# Patient Record
Sex: FEMALE | Race: BLACK OR AFRICAN AMERICAN | NOT HISPANIC OR LATINO
[De-identification: names, ages, dates, MRNs, and addresses within clinical notes are randomized per-mention and may not be internally consistent; named-entity substitution may affect disease eponyms.]

---

## 2020-03-03 ENCOUNTER — APPOINTMENT (OUTPATIENT)
Dept: OTOLARYNGOLOGY | Facility: CLINIC | Age: 60
End: 2020-03-03

## 2021-04-20 PROBLEM — Z00.00 ENCOUNTER FOR PREVENTIVE HEALTH EXAMINATION: Status: ACTIVE | Noted: 2021-04-20

## 2021-04-21 ENCOUNTER — APPOINTMENT (OUTPATIENT)
Dept: CARDIOLOGY | Facility: CLINIC | Age: 61
End: 2021-04-21
Payer: COMMERCIAL

## 2021-04-21 ENCOUNTER — NON-APPOINTMENT (OUTPATIENT)
Age: 61
End: 2021-04-21

## 2021-04-21 VITALS
WEIGHT: 147 LBS | RESPIRATION RATE: 16 BRPM | DIASTOLIC BLOOD PRESSURE: 84 MMHG | HEART RATE: 98 BPM | TEMPERATURE: 98.7 F | BODY MASS INDEX: 25.1 KG/M2 | SYSTOLIC BLOOD PRESSURE: 151 MMHG | OXYGEN SATURATION: 98 % | HEIGHT: 64 IN

## 2021-04-21 DIAGNOSIS — Z78.9 OTHER SPECIFIED HEALTH STATUS: ICD-10-CM

## 2021-04-21 DIAGNOSIS — E11.9 TYPE 2 DIABETES MELLITUS W/OUT COMPLICATIONS: ICD-10-CM

## 2021-04-21 DIAGNOSIS — H91.90 UNSPECIFIED HEARING LOSS, UNSPECIFIED EAR: ICD-10-CM

## 2021-04-21 DIAGNOSIS — R07.9 CHEST PAIN, UNSPECIFIED: ICD-10-CM

## 2021-04-21 DIAGNOSIS — Z80.42 FAMILY HISTORY OF MALIGNANT NEOPLASM OF PROSTATE: ICD-10-CM

## 2021-04-21 DIAGNOSIS — G62.9 POLYNEUROPATHY, UNSPECIFIED: ICD-10-CM

## 2021-04-21 DIAGNOSIS — E78.00 PURE HYPERCHOLESTEROLEMIA, UNSPECIFIED: ICD-10-CM

## 2021-04-21 PROCEDURE — 99072 ADDL SUPL MATRL&STAF TM PHE: CPT

## 2021-04-21 PROCEDURE — 99204 OFFICE O/P NEW MOD 45 MIN: CPT

## 2021-04-21 PROCEDURE — 93000 ELECTROCARDIOGRAM COMPLETE: CPT

## 2021-04-21 RX ORDER — CLOPIDOGREL 75 MG/1
75 TABLET, FILM COATED ORAL DAILY
Refills: 0 | Status: ACTIVE | COMMUNITY

## 2021-04-21 RX ORDER — EMPAGLIFLOZIN AND LINAGLIPTIN 10; 5 MG/1; MG/1
10-5 TABLET, FILM COATED ORAL DAILY
Refills: 0 | Status: ACTIVE | COMMUNITY

## 2021-04-21 RX ORDER — BIOTIN 10 MG
TABLET ORAL DAILY
Refills: 0 | Status: ACTIVE | COMMUNITY

## 2021-04-21 RX ORDER — METFORMIN HYDROCHLORIDE 1000 MG/1
1000 TABLET, COATED ORAL DAILY
Refills: 0 | Status: ACTIVE | COMMUNITY

## 2021-04-23 ENCOUNTER — OUTPATIENT (OUTPATIENT)
Dept: OUTPATIENT SERVICES | Facility: HOSPITAL | Age: 61
LOS: 1 days | End: 2021-04-23

## 2021-04-23 ENCOUNTER — APPOINTMENT (OUTPATIENT)
Dept: CV DIAGNOSTICS | Facility: HOSPITAL | Age: 61
End: 2021-04-23
Payer: COMMERCIAL

## 2021-04-23 ENCOUNTER — TRANSCRIPTION ENCOUNTER (OUTPATIENT)
Age: 61
End: 2021-04-23

## 2021-04-23 DIAGNOSIS — R07.9 CHEST PAIN, UNSPECIFIED: ICD-10-CM

## 2021-04-23 PROCEDURE — 78452 HT MUSCLE IMAGE SPECT MULT: CPT | Mod: 26

## 2021-04-23 PROCEDURE — 93018 CV STRESS TEST I&R ONLY: CPT | Mod: GC

## 2021-04-23 PROCEDURE — 93016 CV STRESS TEST SUPVJ ONLY: CPT | Mod: GC

## 2021-06-14 NOTE — REVIEW OF SYSTEMS
[Fever] : no fever [Chills] : no chills [Blurry Vision] : no blurred vision [Earache] : no earache [SOB] : no shortness of breath [Dyspnea on exertion] : not dyspnea during exertion [Chest Discomfort] : chest discomfort [Cough] : no cough [Abdominal Pain] : no abdominal pain [Dysuria] : no dysuria [Convulsions] : no convulsions [Limb Weakness (Paresis)] : no limb weakness (Paresis) [Confusion] : no confusion was observed [Easy Bleeding] : no tendency for easy bleeding

## 2021-06-14 NOTE — ASSESSMENT
[FreeTextEntry1] : 1.  Chest pain: Knowing her multiple risk factors I recommend nuclear stress test to rule out ischemia\par \par 2.  Hypertension: Blood pressure is elevated but we will see what her blood pressure is during the course of stress testing in the next clinic visit and if the blood pressure is persistently elevated then uptitrate her medications, in the interim I have recommended medication compliance, salt salt restriction, sleep hygiene and exercise.\par \par 3.  Hyperlipidemia: We will request records from primary care physician to go over her lipid profile.

## 2021-06-14 NOTE — HISTORY OF PRESENT ILLNESS
[FreeTextEntry1] : 61-year-old female with history of hypertension hyperlipidemia type 2 diabetes presented with episodes of chest pain for the last 3 months pain described as off-and-on sometimes exertional sometimes nonexertional on the left side like a sharp pain.  Symptoms have been pretty much constant for the last several months.

## 2021-11-02 ENCOUNTER — INPATIENT (INPATIENT)
Facility: HOSPITAL | Age: 61
LOS: 0 days | Discharge: HOME | End: 2021-11-03
Attending: INTERNAL MEDICINE | Admitting: INTERNAL MEDICINE
Payer: MEDICARE

## 2021-11-02 VITALS
RESPIRATION RATE: 18 BRPM | HEART RATE: 89 BPM | WEIGHT: 145.95 LBS | SYSTOLIC BLOOD PRESSURE: 150 MMHG | HEIGHT: 64 IN | TEMPERATURE: 98 F | OXYGEN SATURATION: 97 % | DIASTOLIC BLOOD PRESSURE: 77 MMHG

## 2021-11-02 LAB
ALBUMIN SERPL ELPH-MCNC: 3.7 G/DL — SIGNIFICANT CHANGE UP (ref 3.3–5)
ALP SERPL-CCNC: 68 U/L — SIGNIFICANT CHANGE UP (ref 40–120)
ALT FLD-CCNC: 20 U/L — SIGNIFICANT CHANGE UP (ref 12–78)
ANION GAP SERPL CALC-SCNC: 4 MMOL/L — LOW (ref 5–17)
APPEARANCE UR: CLEAR — SIGNIFICANT CHANGE UP
AST SERPL-CCNC: 13 U/L — LOW (ref 15–37)
BASOPHILS # BLD AUTO: 0.02 K/UL — SIGNIFICANT CHANGE UP (ref 0–0.2)
BASOPHILS NFR BLD AUTO: 0.2 % — SIGNIFICANT CHANGE UP (ref 0–2)
BILIRUB SERPL-MCNC: 0.4 MG/DL — SIGNIFICANT CHANGE UP (ref 0.2–1.2)
BILIRUB UR-MCNC: NEGATIVE — SIGNIFICANT CHANGE UP
BUN SERPL-MCNC: 13 MG/DL — SIGNIFICANT CHANGE UP (ref 7–23)
CALCIUM SERPL-MCNC: 9.5 MG/DL — SIGNIFICANT CHANGE UP (ref 8.5–10.1)
CHLORIDE SERPL-SCNC: 108 MMOL/L — SIGNIFICANT CHANGE UP (ref 96–108)
CO2 SERPL-SCNC: 30 MMOL/L — SIGNIFICANT CHANGE UP (ref 22–31)
COLOR SPEC: YELLOW — SIGNIFICANT CHANGE UP
CREAT SERPL-MCNC: 0.66 MG/DL — SIGNIFICANT CHANGE UP (ref 0.5–1.3)
DIFF PNL FLD: NEGATIVE — SIGNIFICANT CHANGE UP
EOSINOPHIL # BLD AUTO: 0.01 K/UL — SIGNIFICANT CHANGE UP (ref 0–0.5)
EOSINOPHIL NFR BLD AUTO: 0.1 % — SIGNIFICANT CHANGE UP (ref 0–6)
FLUAV AG NPH QL: SIGNIFICANT CHANGE UP
FLUBV AG NPH QL: SIGNIFICANT CHANGE UP
GLUCOSE BLDC GLUCOMTR-MCNC: 110 MG/DL — HIGH (ref 70–99)
GLUCOSE BLDC GLUCOMTR-MCNC: 125 MG/DL — HIGH (ref 70–99)
GLUCOSE BLDC GLUCOMTR-MCNC: 174 MG/DL — HIGH (ref 70–99)
GLUCOSE SERPL-MCNC: 132 MG/DL — HIGH (ref 70–99)
GLUCOSE UR QL: 1000 MG/DL
HCT VFR BLD CALC: 43.4 % — SIGNIFICANT CHANGE UP (ref 34.5–45)
HGB BLD-MCNC: 13.3 G/DL — SIGNIFICANT CHANGE UP (ref 11.5–15.5)
IMM GRANULOCYTES NFR BLD AUTO: 0.4 % — SIGNIFICANT CHANGE UP (ref 0–1.5)
KETONES UR-MCNC: ABNORMAL
LEUKOCYTE ESTERASE UR-ACNC: NEGATIVE — SIGNIFICANT CHANGE UP
LIDOCAIN IGE QN: 815 U/L — HIGH (ref 73–393)
LYMPHOCYTES # BLD AUTO: 1.59 K/UL — SIGNIFICANT CHANGE UP (ref 1–3.3)
LYMPHOCYTES # BLD AUTO: 18.9 % — SIGNIFICANT CHANGE UP (ref 13–44)
MAGNESIUM SERPL-MCNC: 2.1 MG/DL — SIGNIFICANT CHANGE UP (ref 1.6–2.6)
MCHC RBC-ENTMCNC: 22.9 PG — LOW (ref 27–34)
MCHC RBC-ENTMCNC: 30.6 GM/DL — LOW (ref 32–36)
MCV RBC AUTO: 74.7 FL — LOW (ref 80–100)
MONOCYTES # BLD AUTO: 0.42 K/UL — SIGNIFICANT CHANGE UP (ref 0–0.9)
MONOCYTES NFR BLD AUTO: 5 % — SIGNIFICANT CHANGE UP (ref 2–14)
NEUTROPHILS # BLD AUTO: 6.35 K/UL — SIGNIFICANT CHANGE UP (ref 1.8–7.4)
NEUTROPHILS NFR BLD AUTO: 75.4 % — SIGNIFICANT CHANGE UP (ref 43–77)
NITRITE UR-MCNC: NEGATIVE — SIGNIFICANT CHANGE UP
NRBC # BLD: 0 /100 WBCS — SIGNIFICANT CHANGE UP (ref 0–0)
NT-PROBNP SERPL-SCNC: 20 PG/ML — SIGNIFICANT CHANGE UP (ref 0–125)
PH UR: 5 — SIGNIFICANT CHANGE UP (ref 5–8)
PLATELET # BLD AUTO: 229 K/UL — SIGNIFICANT CHANGE UP (ref 150–400)
POTASSIUM SERPL-MCNC: 3.9 MMOL/L — SIGNIFICANT CHANGE UP (ref 3.5–5.3)
POTASSIUM SERPL-SCNC: 3.9 MMOL/L — SIGNIFICANT CHANGE UP (ref 3.5–5.3)
PROT SERPL-MCNC: 7.9 GM/DL — SIGNIFICANT CHANGE UP (ref 6–8.3)
PROT UR-MCNC: NEGATIVE MG/DL — SIGNIFICANT CHANGE UP
RBC # BLD: 5.81 M/UL — HIGH (ref 3.8–5.2)
RBC # FLD: 16.8 % — HIGH (ref 10.3–14.5)
SARS-COV-2 RNA SPEC QL NAA+PROBE: SIGNIFICANT CHANGE UP
SODIUM SERPL-SCNC: 142 MMOL/L — SIGNIFICANT CHANGE UP (ref 135–145)
SP GR SPEC: 1.01 — SIGNIFICANT CHANGE UP (ref 1.01–1.02)
TROPONIN I, HIGH SENSITIVITY RESULT: <3 NG/L — SIGNIFICANT CHANGE UP
UROBILINOGEN FLD QL: NEGATIVE MG/DL — SIGNIFICANT CHANGE UP
WBC # BLD: 8.42 K/UL — SIGNIFICANT CHANGE UP (ref 3.8–10.5)
WBC # FLD AUTO: 8.42 K/UL — SIGNIFICANT CHANGE UP (ref 3.8–10.5)

## 2021-11-02 PROCEDURE — 99223 1ST HOSP IP/OBS HIGH 75: CPT

## 2021-11-02 PROCEDURE — 70450 CT HEAD/BRAIN W/O DYE: CPT | Mod: 26,MA

## 2021-11-02 PROCEDURE — 93010 ELECTROCARDIOGRAM REPORT: CPT

## 2021-11-02 PROCEDURE — 99285 EMERGENCY DEPT VISIT HI MDM: CPT

## 2021-11-02 PROCEDURE — 71045 X-RAY EXAM CHEST 1 VIEW: CPT | Mod: 26

## 2021-11-02 RX ORDER — INFLUENZA VIRUS VACCINE 15; 15; 15; 15 UG/.5ML; UG/.5ML; UG/.5ML; UG/.5ML
0.5 SUSPENSION INTRAMUSCULAR ONCE
Refills: 0 | Status: DISCONTINUED | OUTPATIENT
Start: 2021-11-02 | End: 2021-11-03

## 2021-11-02 RX ORDER — EMPAGLIFLOZIN AND LINAGLIPTIN 10; 5 MG/1; MG/1
1 TABLET, FILM COATED ORAL
Qty: 0 | Refills: 0 | DISCHARGE

## 2021-11-02 RX ORDER — INSULIN LISPRO 100/ML
5 VIAL (ML) SUBCUTANEOUS
Refills: 0 | Status: DISCONTINUED | OUTPATIENT
Start: 2021-11-02 | End: 2021-11-03

## 2021-11-02 RX ORDER — CLOPIDOGREL BISULFATE 75 MG/1
75 TABLET, FILM COATED ORAL DAILY
Refills: 0 | Status: DISCONTINUED | OUTPATIENT
Start: 2021-11-02 | End: 2021-11-03

## 2021-11-02 RX ORDER — DEXTROSE 50 % IN WATER 50 %
12.5 SYRINGE (ML) INTRAVENOUS ONCE
Refills: 0 | Status: DISCONTINUED | OUTPATIENT
Start: 2021-11-02 | End: 2021-11-03

## 2021-11-02 RX ORDER — SODIUM CHLORIDE 9 MG/ML
1000 INJECTION, SOLUTION INTRAVENOUS
Refills: 0 | Status: DISCONTINUED | OUTPATIENT
Start: 2021-11-02 | End: 2021-11-03

## 2021-11-02 RX ORDER — CLOPIDOGREL BISULFATE 75 MG/1
1 TABLET, FILM COATED ORAL
Qty: 0 | Refills: 0 | DISCHARGE

## 2021-11-02 RX ORDER — INSULIN GLARGINE 100 [IU]/ML
10 INJECTION, SOLUTION SUBCUTANEOUS AT BEDTIME
Refills: 0 | Status: DISCONTINUED | OUTPATIENT
Start: 2021-11-02 | End: 2021-11-03

## 2021-11-02 RX ORDER — DEXTROSE 50 % IN WATER 50 %
15 SYRINGE (ML) INTRAVENOUS ONCE
Refills: 0 | Status: DISCONTINUED | OUTPATIENT
Start: 2021-11-02 | End: 2021-11-03

## 2021-11-02 RX ORDER — GABAPENTIN 400 MG/1
0 CAPSULE ORAL
Qty: 0 | Refills: 0 | DISCHARGE

## 2021-11-02 RX ORDER — DEXTROSE 50 % IN WATER 50 %
25 SYRINGE (ML) INTRAVENOUS ONCE
Refills: 0 | Status: DISCONTINUED | OUTPATIENT
Start: 2021-11-02 | End: 2021-11-03

## 2021-11-02 RX ORDER — ATORVASTATIN CALCIUM 80 MG/1
1 TABLET, FILM COATED ORAL
Qty: 0 | Refills: 0 | DISCHARGE

## 2021-11-02 RX ORDER — LISINOPRIL 2.5 MG/1
20 TABLET ORAL DAILY
Refills: 0 | Status: DISCONTINUED | OUTPATIENT
Start: 2021-11-02 | End: 2021-11-03

## 2021-11-02 RX ORDER — GABAPENTIN 400 MG/1
100 CAPSULE ORAL DAILY
Refills: 0 | Status: DISCONTINUED | OUTPATIENT
Start: 2021-11-02 | End: 2021-11-03

## 2021-11-02 RX ORDER — GLUCAGON INJECTION, SOLUTION 0.5 MG/.1ML
1 INJECTION, SOLUTION SUBCUTANEOUS ONCE
Refills: 0 | Status: DISCONTINUED | OUTPATIENT
Start: 2021-11-02 | End: 2021-11-03

## 2021-11-02 RX ORDER — HEPARIN SODIUM 5000 [USP'U]/ML
5000 INJECTION INTRAVENOUS; SUBCUTANEOUS EVERY 12 HOURS
Refills: 0 | Status: DISCONTINUED | OUTPATIENT
Start: 2021-11-02 | End: 2021-11-03

## 2021-11-02 RX ORDER — RAMIPRIL 5 MG
1 CAPSULE ORAL
Qty: 0 | Refills: 0 | DISCHARGE

## 2021-11-02 RX ORDER — METFORMIN HYDROCHLORIDE 850 MG/1
0 TABLET ORAL
Qty: 0 | Refills: 0 | DISCHARGE

## 2021-11-02 RX ORDER — ATORVASTATIN CALCIUM 80 MG/1
40 TABLET, FILM COATED ORAL AT BEDTIME
Refills: 0 | Status: DISCONTINUED | OUTPATIENT
Start: 2021-11-02 | End: 2021-11-03

## 2021-11-02 RX ADMIN — INSULIN GLARGINE 10 UNIT(S): 100 INJECTION, SOLUTION SUBCUTANEOUS at 22:51

## 2021-11-02 RX ADMIN — GABAPENTIN 100 MILLIGRAM(S): 400 CAPSULE ORAL at 22:50

## 2021-11-02 RX ADMIN — HEPARIN SODIUM 5000 UNIT(S): 5000 INJECTION INTRAVENOUS; SUBCUTANEOUS at 22:50

## 2021-11-02 RX ADMIN — ATORVASTATIN CALCIUM 40 MILLIGRAM(S): 80 TABLET, FILM COATED ORAL at 22:50

## 2021-11-02 RX ADMIN — CLOPIDOGREL BISULFATE 75 MILLIGRAM(S): 75 TABLET, FILM COATED ORAL at 22:50

## 2021-11-02 NOTE — H&P ADULT - NSHPPHYSICALEXAM_GEN_ALL_CORE
GENERAL: NAD, well-groomed, well-developed  HEAD:  Atraumatic, Normocephalic  EYES: EOMI, PERRLA, conjunctiva and sclera clear  ENMT: No tonsillar erythema, exudates, or enlargement; Moist mucous membranes, Good dentition, No lesions  NECK: Supple, No JVD, Normal thyroid  NERVOUS SYSTEM:  Alert & Oriented X3, non focal, fluent speech  CHEST/LUNG: Clear to percussion bilaterally; No rales, rhonchi, wheezing, or rubs  HEART: Regular rate and rhythm; No murmurs, rubs, or gallops  ABDOMEN: Soft, Nontender, Nondistended; Bowel sounds present  EXTREMITIES:  2+ Peripheral Pulses, No clubbing, cyanosis, or edema  LYMPH: No lymphadenopathy noted   SKIN: No rashes or lesions

## 2021-11-02 NOTE — ED PROVIDER NOTE - NEUROLOGICAL, MLM
Alert and oriented, no focal deficits, no dysmetria, normal finger to nose, slight left hand weakness but which is baseline per patient, fluent in speech, no word finding difficulty on exam

## 2021-11-02 NOTE — ED PROVIDER NOTE - CLINICAL SUMMARY MEDICAL DECISION MAKING FREE TEXT BOX
DDx: CVA outside of TPA window, complex migraine.  Plan: CT head, neuro consult, CBC, CMP, mag, troponin, and reassess. DDx: CVA outside of TPA window, complex migraine. NIH SS 0. Speech is fluent on exam  Plan: CT head, neuro consult, CBC, CMP, mag, troponin, and reassess.

## 2021-11-02 NOTE — H&P ADULT - HISTORY OF PRESENT ILLNESS
61 year old female w/PMH of CVA (on Plavix) w/residual left sided weakness, DM (on Metformin), presents to the ED BIBEMS sent by PMD for intermittent AMS and difficulty with word finding x1-2 weeks. Pt also c/o intermittent right sided headache and lightheadedness x2 weeks. Denies fever/chills, cough, SOB, CP, abdominal pain, visual changes, facial droop, slurred speech or focal weakness. Pt is vaccinated against COVID w/Moderna.  Pt has H/o left thalamic lacunar infarct 2 yrs ago.  Pt saw her ophthalmologist 2 days ago.  Currently denies any symptoms.

## 2021-11-02 NOTE — H&P ADULT - NSHPREVIEWOFSYSTEMS_GEN_ALL_CORE
CONSTITUTIONAL: No fever, weight loss, or fatigue  EYES: No eye pain, visual disturbances, or discharge  ENMT:  No difficulty hearing, tinnitus, vertigo; No sinus or throat pain  NECK: No pain or stiffness  BREASTS: No pain, masses, or nipple discharge  RESPIRATORY: No cough, wheezing, chills or hemoptysis; No shortness of breath  CARDIOVASCULAR: No chest pain, palpitations, dizziness, or leg swelling  GASTROINTESTINAL: No abdominal or epigastric pain. No nausea, vomiting, or hematemesis; No diarrhea or constipation. No melena or hematochezia.  GENITOURINARY: No dysuria, frequency, hematuria, or incontinence  NEUROLOGICAL: + headaches, confusion, no memory loss, loss of strength, numbness, or tremors  SKIN: No itching, burning, rashes, or lesions   LYMPH NODES: No enlarged glands  ENDOCRINE: No heat or cold intolerance; No hair loss  MUSCULOSKELETAL: No joint pain or swelling; No muscle, back, or extremity pain  PSYCHIATRIC: No depression, anxiety, mood swings, or difficulty sleeping  HEME/LYMPH: No easy bruising, or bleeding gums  ALLERGY AND IMMUNOLOGIC: No hives or eczema

## 2021-11-02 NOTE — H&P ADULT - ASSESSMENT
61 year old female w/PMH of CVA (on Plavix) w/residual left sided weakness, DM (on Metformin), presents to the ED BIBEMS sent by PMD for intermittent AMS and difficulty with word finding x1-2 weeks. Pt also c/o intermittent right sided headache and lightheadedness x2 weeks. Denies fever/chills, cough, SOB, CP, abdominal pain, visual changes, facial droop, slurred speech or focal weakness. Pt is vaccinated against COVID w/Moderna.  Pt has H/o left thalamic lacunar infarct 2 yrs ago.  Pt saw her ophthalmologist 2 days ago.  Currently denies any symptoms. 61 year old female w/PMH of CVA (on Plavix) w/residual left sided weakness, DM (on Metformin), presents to the ED BIBEMS sent by PMD for intermittent AMS and difficulty with word finding x1-2 weeks. Pt also c/o intermittent right sided headache and lightheadedness x2 weeks. Denies fever/chills, cough, SOB, CP, abdominal pain, visual changes, facial droop, slurred speech or focal weakness. Pt is vaccinated against COVID w/Moderna.  Pt has H/o left thalamic lacunar infarct 2 yrs ago.  Pt saw her ophthalmologist 2 days ago.  Currently denies any symptoms.    R/O Acute CVA/TIA:  - Symptoms been going on for few wks intermittently  - Migraine?  - CT with Confluent hypodensity involving the white matter of the right centrum semiovale which is asymmetrically more prominent than the left side. Additionally there is is hypodensity involving the subcortical white matter of the right Broca area. This can just represent chronic microvascular ischemic changes. However, acute white matter infarct cannot be excluded. No acute intracranial hemorrhage.  - Follow up MRI brain, MRA head and neck  - 2D echo  - Telemetry  - Lipid panel, Hb A1c  - Continue Plavix, increase Lipitor to 40 mg QHS  - Dr. Moon was consulted by ER     DM:  - Hold home meds  - HbA1C  - Insulin basal/ bolus regimen    Elevated lipase:  - Asymptomatic  - Continue to monitor    DVT ppx

## 2021-11-02 NOTE — PATIENT PROFILE ADULT - STATED REASON FOR ADMISSION
Patient reports feeling headache (sharp), lightheaded every morning for 2 weeks. Instructed to come in by PCP.

## 2021-11-02 NOTE — ED PROVIDER NOTE - OBJECTIVE STATEMENT
61 year old female w/PMH of CVA (on Plavix) w/residual left sided weakness, DM (on Metformin), presents to the ED BIBEMS sent by PMD for intermittent AMS and difficulty with word finding x1-2 weeks, r/o CVA/TIA. Pt also c/o intermittent right sided headache and lightheadedness x2 weeks. Denies fever/chills, cough, SOB, CP, abdominal pain, visual changes, facial droop, slurred speech or focal weakness. Pt is vaccinated against COVID w/Moderna.

## 2021-11-02 NOTE — ED ADULT TRIAGE NOTE - DOMESTIC TRAVEL HIGH RISK QUESTION
Zoltan Hart from [facility] returned my call. Spoke to her concerning Dr Aaliyah Vivas concerns. Dwayne Espinoza will be following and setting patient up to come into the care center. Dwayne Espinoza will look into mental health services for patient. Dr TRACY [facility] will be following patient will at [facility]. Dwayne Espinoza will send discharge instructions to me at discharge. No

## 2021-11-02 NOTE — ED ADULT TRIAGE NOTE - CHIEF COMPLAINT QUOTE
pt sent to Ed by pmd for intermittent AMS and difficulty finding words x 2 weeks to r/o TIA. pt also c/o intermittent headache. hx: cva with left side weakness, DM,

## 2021-11-02 NOTE — H&P ADULT - NSHPLABSRESULTS_GEN_ALL_CORE
13.3   8.42  )-----------( 229      ( 2021 15:15 )             43.4     11    142  |  108  |  13  ----------------------------<  132<H>  3.9   |  30  |  0.66    Ca    9.5      2021 15:15  Mg     2.1         TPro  7.9  /  Alb  3.7  /  TBili  0.4  /  DBili  x   /  AST  13<L>  /  ALT  20  /  AlkPhos  68        Urinalysis Basic - ( 2021 15:24 )    Color: Yellow / Appearance: Clear / S.010 / pH: x  Gluc: x / Ketone: Trace  / Bili: Negative / Urobili: Negative mg/dL   Blood: x / Protein: Negative mg/dL / Nitrite: Negative   Leuk Esterase: Negative / RBC: x / WBC x   Sq Epi: x / Non Sq Epi: x / Bacteria: x

## 2021-11-03 ENCOUNTER — TRANSCRIPTION ENCOUNTER (OUTPATIENT)
Age: 61
End: 2021-11-03

## 2021-11-03 VITALS
RESPIRATION RATE: 18 BRPM | HEART RATE: 97 BPM | TEMPERATURE: 98 F | DIASTOLIC BLOOD PRESSURE: 71 MMHG | SYSTOLIC BLOOD PRESSURE: 107 MMHG | OXYGEN SATURATION: 98 %

## 2021-11-03 LAB
A1C WITH ESTIMATED AVERAGE GLUCOSE RESULT: 6.8 % — HIGH (ref 4–5.6)
CHOLEST SERPL-MCNC: 156 MG/DL — SIGNIFICANT CHANGE UP
COVID-19 NUCLEOCAPSID GAM AB INTERP: NEGATIVE — SIGNIFICANT CHANGE UP
COVID-19 NUCLEOCAPSID TOTAL GAM ANTIBODY RESULT: 0.12 INDEX — SIGNIFICANT CHANGE UP
COVID-19 SPIKE DOMAIN AB INTERP: POSITIVE
COVID-19 SPIKE DOMAIN ANTIBODY RESULT: >250 U/ML — HIGH
ERYTHROCYTE [SEDIMENTATION RATE] IN BLOOD: 7 MM/HR — SIGNIFICANT CHANGE UP (ref 0–20)
ESTIMATED AVERAGE GLUCOSE: 148 MG/DL — HIGH (ref 68–114)
GLUCOSE BLDC GLUCOMTR-MCNC: 102 MG/DL — HIGH (ref 70–99)
GLUCOSE BLDC GLUCOMTR-MCNC: 164 MG/DL — HIGH (ref 70–99)
GLUCOSE BLDC GLUCOMTR-MCNC: 90 MG/DL — SIGNIFICANT CHANGE UP (ref 70–99)
HCV AB S/CO SERPL IA: 0.12 S/CO — SIGNIFICANT CHANGE UP (ref 0–0.99)
HCV AB SERPL-IMP: SIGNIFICANT CHANGE UP
HDLC SERPL-MCNC: 59 MG/DL — SIGNIFICANT CHANGE UP
LIPID PNL WITH DIRECT LDL SERPL: 81 MG/DL — SIGNIFICANT CHANGE UP
NON HDL CHOLESTEROL: 97 MG/DL — SIGNIFICANT CHANGE UP
SARS-COV-2 IGG+IGM SERPL QL IA: 0.12 INDEX — SIGNIFICANT CHANGE UP
SARS-COV-2 IGG+IGM SERPL QL IA: >250 U/ML — HIGH
SARS-COV-2 IGG+IGM SERPL QL IA: NEGATIVE — SIGNIFICANT CHANGE UP
SARS-COV-2 IGG+IGM SERPL QL IA: POSITIVE
TRIGL SERPL-MCNC: 79 MG/DL — SIGNIFICANT CHANGE UP
TSH SERPL-MCNC: 1.14 UIU/ML — SIGNIFICANT CHANGE UP (ref 0.36–3.74)

## 2021-11-03 PROCEDURE — 99239 HOSP IP/OBS DSCHRG MGMT >30: CPT

## 2021-11-03 PROCEDURE — 93306 TTE W/DOPPLER COMPLETE: CPT | Mod: 26

## 2021-11-03 PROCEDURE — 70547 MR ANGIOGRAPHY NECK W/O DYE: CPT | Mod: 26

## 2021-11-03 PROCEDURE — 70544 MR ANGIOGRAPHY HEAD W/O DYE: CPT | Mod: 26,59

## 2021-11-03 PROCEDURE — 70551 MRI BRAIN STEM W/O DYE: CPT | Mod: 26

## 2021-11-03 RX ORDER — MECLIZINE HCL 12.5 MG
1 TABLET ORAL
Qty: 15 | Refills: 0
Start: 2021-11-03 | End: 2021-11-17

## 2021-11-03 RX ORDER — ATORVASTATIN CALCIUM 80 MG/1
80 TABLET, FILM COATED ORAL AT BEDTIME
Refills: 0 | Status: DISCONTINUED | OUTPATIENT
Start: 2021-11-03 | End: 2021-11-03

## 2021-11-03 RX ADMIN — HEPARIN SODIUM 5000 UNIT(S): 5000 INJECTION INTRAVENOUS; SUBCUTANEOUS at 10:01

## 2021-11-03 RX ADMIN — CLOPIDOGREL BISULFATE 75 MILLIGRAM(S): 75 TABLET, FILM COATED ORAL at 12:52

## 2021-11-03 RX ADMIN — Medication 5 UNIT(S): at 08:35

## 2021-11-03 RX ADMIN — Medication 5 UNIT(S): at 12:52

## 2021-11-03 RX ADMIN — Medication 1 MILLIGRAM(S): at 10:00

## 2021-11-03 NOTE — OCCUPATIONAL THERAPY INITIAL EVALUATION ADULT - COORDINATION ASSESSED, REHAB EVAL
intact in BUE diminished in LUE/ LLE/finger to nose/heel to shin intact in BUE,  diminished in LUE/ LLE/finger to nose/heel to shin

## 2021-11-03 NOTE — OCCUPATIONAL THERAPY INITIAL EVALUATION ADULT - GENERAL OBSERVATIONS, REHAB EVAL
Pt was seen for initial OT consult, encountered in bed on  on cardiac monitoring. Pt makes needs, wants known, moves BUE/ BLE without difficulty and integrates both sides of her body despite left residual weakness from old CVA. Pt was AA&Ox4, cooperative & followed commands. Pt c/o denied pain, but c/o dizziness with positional changes. This limits pt's activity tolerance ,balance, ADL management and functional mobility.

## 2021-11-03 NOTE — CONSULT NOTE ADULT - ASSESSMENT
To be completed    She may be developing cognitive impairment unrelated to the chronic stroke with resodual L hemiparesis.        RECOMMENDATIONS    SER, CRP, TSH, syphilis serology, B12, folate, methylmalonic acid, homocysteine, AURELIA, SPEP    (I have ordered the lab work; Pt to follow-up w neurology as out-patient)                                                         IMPORTANT  -  PLEASE NOTE:                              I am a neurohospitalist. I do not see patients outside of the hospital.        Patients requiring neurological follow-up after discharge may contact:       [place] [address]  [phone]    Neurocognitive neurologists:    Dr. Price King

## 2021-11-03 NOTE — PHYSICAL THERAPY INITIAL EVALUATION ADULT - PERTINENT HX OF CURRENT PROBLEM, REHAB EVAL
61 year old female w/PMH of CVA (on Plavix) w/residual left sided weakness, DM (on Metformin), presents to the ED BIBEMS sent by PMD for intermittent AMS and difficulty with word finding x1-2 weeks. Pt also c/o intermittent right sided headache and lightheadedness x2 weeks.

## 2021-11-03 NOTE — OCCUPATIONAL THERAPY INITIAL EVALUATION ADULT - PERTINENT HX OF CURRENT PROBLEM, REHAB EVAL
Pt presented to ER due to acute onset of neurological deficits. Pt is diagnosed with CVA with difficulty finding words. MRI on 11/3/21 results confirm no acute intracranial hemorrhage or acute infarct

## 2021-11-03 NOTE — DISCHARGE NOTE NURSING/CASE MANAGEMENT/SOCIAL WORK - PATIENT PORTAL LINK FT
You can access the FollowMyHealth Patient Portal offered by [facility] by registering at the following website: [email or web address]. By joining [employer]’s FollowMyHealth portal, you will also be able to view your health information using other applications (apps) compatible with our system.

## 2021-11-03 NOTE — DISCHARGE NOTE PROVIDER - CARE PROVIDER_API CALL
Karina Moon  NEUROLOGY - GENERAL  [address] [place] [address]  Phone: [phone]  Fax: [phone]  Follow Up Time:

## 2021-11-03 NOTE — OCCUPATIONAL THERAPY INITIAL EVALUATION ADULT - ADDITIONAL COMMENTS
Prior to admission, pt was functioning in her roles, self sufficient & ambulating independently without any assistive devices. Pt does not show any significant deficit as compared to preadmission status. Pt is right hand dominant and wears glasses for reading. Dexterity and fine motor skills are diminished in left hand due to old CVA.. Prior to admission, pt was functioning in her roles, self sufficient & ambulating independently without any assistive devices. Pt does not show any significant deficits  as compared to preadmission status. Pt is right hand dominant and wears glasses for reading. Dexterity and fine motor skills are diminished in left hand due to old CVA..

## 2021-11-03 NOTE — PHYSICAL THERAPY INITIAL EVALUATION ADULT - GENERAL OBSERVATIONS, REHAB EVAL
Pt was seen in semi-supine c cardiac monitor donned, alert and Ox4. Pt was comfortable and motivated. Sensation and visual field are intact. Dysdiadokinesia test of all four extremities is grossly intact except L ankle is slightly impaired due to old stroke.

## 2021-11-03 NOTE — OCCUPATIONAL THERAPY INITIAL EVALUATION ADULT - LIVES WITH, PROFILE
in a private house with 8 steps to enter equipped with B/L ascending handrail. Once inside, pt has to negotiate 2 flights of stairs , with left handrail to access the bedroom and bathroom. The bathroom has a tub/shower combination, fixed / retractable handrail  and standard toilet./spouse

## 2021-11-03 NOTE — OCCUPATIONAL THERAPY INITIAL EVALUATION ADULT - SOCIAL CONCERNS
Pt voiced concerns about current symptoms of CVA and hops it does not recur./Complex psychosocial needs/coping issues

## 2021-11-03 NOTE — DISCHARGE NOTE PROVIDER - NSDCMRMEDTOKEN_GEN_ALL_CORE_FT
gabapentin 100 mg oral tablet:   Glyxambi 10 mg-5 mg oral tablet: 1 tab(s) orally once a day (in the morning)  Lipitor 10 mg oral tablet: 1 tab(s) orally once a day  metFORMIN 1000 mg oral tablet:   Plavix 75 mg oral tablet: 1 tab(s) orally once a day  ramipril 5 mg oral capsule: 1 cap(s) orally once a day   gabapentin 100 mg oral tablet:   Glyxambi 10 mg-5 mg oral tablet: 1 tab(s) orally once a day (in the morning)  Lipitor 10 mg oral tablet: 1 tab(s) orally once a day  meclizine 12.5 mg oral tablet: 1 tab(s) orally once a day, As Needed -for dizziness  metFORMIN 1000 mg oral tablet:   Plavix 75 mg oral tablet: 1 tab(s) orally once a day  ramipril 5 mg oral capsule: 1 cap(s) orally once a day

## 2021-11-03 NOTE — PHYSICAL THERAPY INITIAL EVALUATION ADULT - GAIT TRAINING, PT EVAL
Pt will independently ambulate 500 feet with rolling walker without loss of balance, by 2-3days. Pt will independently ambulate 500 feet with rolling walker without loss of balance, by discharge. Pt will independently ambulate 500 feet w/o assist device and w/o loss of balance, by discharge.

## 2021-11-03 NOTE — OCCUPATIONAL THERAPY INITIAL EVALUATION ADULT - BALANCE TRAINING, PT EVAL
Pt will increased standing balance from good- to good in 3 days to prevent falls, optimize pt's ability for ADL management & safely navigate in all terrains

## 2021-11-03 NOTE — DISCHARGE NOTE PROVIDER - NSDCCPCAREPLAN_GEN_ALL_CORE_FT
PRINCIPAL DISCHARGE DIAGNOSIS  Diagnosis: Migraine headache  Assessment and Plan of Treatment:       SECONDARY DISCHARGE DIAGNOSES  Diagnosis: Type 2 diabetes mellitus  Assessment and Plan of Treatment:     Diagnosis: HLD (hyperlipidemia)  Assessment and Plan of Treatment:     Diagnosis: Benign essential HTN  Assessment and Plan of Treatment:

## 2021-11-03 NOTE — DISCHARGE NOTE PROVIDER - HOSPITAL COURSE
61 year old female w/PMH of CVA (on Plavix) w/residual left sided weakness, DM (on Metformin), presents to the ED BIBEMS sent by PMD for intermittent AMS and difficulty with word finding x1-2 weeks. Pt also c/o intermittent right sided headache and lightheadedness x2 weeks. Denies fever/chills, cough, SOB, CP, abdominal pain, visual changes, facial droop, slurred speech or focal weakness. Pt is vaccinated against COVID w/Moderna.  Pt has H/o left thalamic lacunar infarct 2 yrs ago.  Pt saw her ophthalmologist 2 days ago.  Currently denies any symptoms.    Migraine  - Symptoms been going on for few wks intermittently  - CT with Confluent hypodensity involving the white matter of the right centrum semiovale which is asymmetrically more prominent than the left side. Additionally there is is hypodensity involving the subcortical white matter of the right Broca area. This can just represent chronic microvascular ischemic changes. However, acute white matter infarct cannot be excluded. No acute intracranial hemorrhage.  - MRI brain, MRA head and neck; No acute intracranial hemorrhage or acute infarct. No hemodynamically significant stenosis  - 2D echo pending result   - Telemetry  - Continue Plavix, increase Lipitor to 40 mg QHS  - Dr. Moon f/up as outpt    DM2:  - resume home meds    Elevated lipase:  - Asymptomatic  - Continue to monitor

## 2021-11-04 LAB
CRP SERPL-MCNC: <3 MG/L — SIGNIFICANT CHANGE UP
CULTURE RESULTS: SIGNIFICANT CHANGE UP
FOLATE SERPL-MCNC: 16.9 NG/ML — SIGNIFICANT CHANGE UP
PROT SERPL-MCNC: 6.7 G/DL — SIGNIFICANT CHANGE UP (ref 6–8.3)
PROT SERPL-MCNC: 6.7 G/DL — SIGNIFICANT CHANGE UP (ref 6–8.3)
SPECIMEN SOURCE: SIGNIFICANT CHANGE UP
T PALLIDUM AB TITR SER: NEGATIVE — SIGNIFICANT CHANGE UP
VIT B12 SERPL-MCNC: 1190 PG/ML — SIGNIFICANT CHANGE UP (ref 232–1245)

## 2021-11-06 LAB
% ALBUMIN: 56.1 % — SIGNIFICANT CHANGE UP
% ALPHA 1: 4.3 % — SIGNIFICANT CHANGE UP
% ALPHA 2: 9.9 % — SIGNIFICANT CHANGE UP
% BETA: 12.1 % — SIGNIFICANT CHANGE UP
% GAMMA: 17.6 % — SIGNIFICANT CHANGE UP
ALBUMIN SERPL ELPH-MCNC: 3.8 G/DL — SIGNIFICANT CHANGE UP (ref 3.6–5.5)
ALBUMIN/GLOB SERPL ELPH: 1.3 RATIO — SIGNIFICANT CHANGE UP
ALPHA1 GLOB SERPL ELPH-MCNC: 0.3 G/DL — SIGNIFICANT CHANGE UP (ref 0.1–0.4)
ALPHA2 GLOB SERPL ELPH-MCNC: 0.7 G/DL — SIGNIFICANT CHANGE UP (ref 0.5–1)
B-GLOBULIN SERPL ELPH-MCNC: 0.8 G/DL — SIGNIFICANT CHANGE UP (ref 0.5–1)
GAMMA GLOBULIN: 1.2 G/DL — SIGNIFICANT CHANGE UP (ref 0.6–1.6)
PROT PATTERN SERPL ELPH-IMP: SIGNIFICANT CHANGE UP

## 2021-11-08 LAB
HOMOCYSTEINE LEVEL: 7 UMOL/L — SIGNIFICANT CHANGE UP
METHYLMALONIC ACID LEVEL: 90 NMOL/L — SIGNIFICANT CHANGE UP (ref 87–318)

## 2021-11-09 DIAGNOSIS — Z79.84 LONG TERM (CURRENT) USE OF ORAL HYPOGLYCEMIC DRUGS: ICD-10-CM

## 2021-11-09 DIAGNOSIS — E78.5 HYPERLIPIDEMIA, UNSPECIFIED: ICD-10-CM

## 2021-11-09 DIAGNOSIS — R51.9 HEADACHE, UNSPECIFIED: ICD-10-CM

## 2021-11-09 DIAGNOSIS — I10 ESSENTIAL (PRIMARY) HYPERTENSION: ICD-10-CM

## 2021-11-09 DIAGNOSIS — E11.9 TYPE 2 DIABETES MELLITUS WITHOUT COMPLICATIONS: ICD-10-CM

## 2021-11-09 DIAGNOSIS — G43.909 MIGRAINE, UNSPECIFIED, NOT INTRACTABLE, WITHOUT STATUS MIGRAINOSUS: ICD-10-CM

## 2021-11-09 DIAGNOSIS — Z79.02 LONG TERM (CURRENT) USE OF ANTITHROMBOTICS/ANTIPLATELETS: ICD-10-CM

## 2021-11-09 DIAGNOSIS — I69.354 HEMIPLEGIA AND HEMIPARESIS FOLLOWING CEREBRAL INFARCTION AFFECTING LEFT NON-DOMINANT SIDE: ICD-10-CM

## 2021-11-09 LAB — ANA TITR SER: NEGATIVE — SIGNIFICANT CHANGE UP

## 2021-11-23 PROBLEM — E11.9 TYPE 2 DIABETES MELLITUS WITHOUT COMPLICATIONS: Chronic | Status: ACTIVE | Noted: 2021-11-02

## 2021-11-23 PROBLEM — I63.9 CEREBRAL INFARCTION, UNSPECIFIED: Chronic | Status: ACTIVE | Noted: 2021-11-02

## 2022-05-23 ENCOUNTER — APPOINTMENT (OUTPATIENT)
Dept: NEUROLOGY | Facility: CLINIC | Age: 62
End: 2022-05-23
Payer: COMMERCIAL

## 2022-05-23 VITALS
DIASTOLIC BLOOD PRESSURE: 90 MMHG | HEIGHT: 64 IN | BODY MASS INDEX: 24.59 KG/M2 | HEART RATE: 98 BPM | WEIGHT: 144 LBS | SYSTOLIC BLOOD PRESSURE: 149 MMHG

## 2022-05-23 DIAGNOSIS — F01.50 VASCULAR DEMENTIA W/OUT BEHAVIORAL DISTURBANCE: ICD-10-CM

## 2022-05-23 DIAGNOSIS — I10 ESSENTIAL (PRIMARY) HYPERTENSION: ICD-10-CM

## 2022-05-23 PROCEDURE — 96116 NUBHVL XM PHYS/QHP 1ST HR: CPT | Mod: 59

## 2022-05-23 PROCEDURE — 99205 OFFICE O/P NEW HI 60 MIN: CPT | Mod: 25

## 2022-05-23 RX ORDER — PRAVASTATIN SODIUM 10 MG/1
10 TABLET ORAL
Refills: 0 | Status: ACTIVE | COMMUNITY

## 2022-05-23 RX ORDER — GABAPENTIN 100 MG
100 TABLET ORAL
Refills: 0 | Status: ACTIVE | COMMUNITY

## 2022-05-23 RX ORDER — ATORVASTATIN CALCIUM 40 MG/1
40 TABLET, FILM COATED ORAL DAILY
Qty: 90 | Refills: 3 | Status: COMPLETED | COMMUNITY
End: 2022-05-23

## 2022-07-07 ENCOUNTER — NON-APPOINTMENT (OUTPATIENT)
Age: 62
End: 2022-07-07

## 2022-07-19 ENCOUNTER — APPOINTMENT (OUTPATIENT)
Dept: NEUROLOGY | Facility: CLINIC | Age: 62
End: 2022-07-19

## 2022-07-19 PROCEDURE — 96132 NRPSYC TST EVAL PHYS/QHP 1ST: CPT

## 2022-07-19 PROCEDURE — 96133 NRPSYC TST EVAL PHYS/QHP EA: CPT

## 2022-07-19 PROCEDURE — 96116 NUBHVL XM PHYS/QHP 1ST HR: CPT

## 2022-07-19 PROCEDURE — 96121 NUBHVL XM PHY/QHP EA ADDL HR: CPT

## 2022-07-19 PROCEDURE — 96136 PSYCL/NRPSYC TST PHY/QHP 1ST: CPT

## 2022-07-19 PROCEDURE — 96137 PSYCL/NRPSYC TST PHY/QHP EA: CPT

## 2022-08-26 ENCOUNTER — APPOINTMENT (OUTPATIENT)
Dept: NEUROLOGY | Facility: CLINIC | Age: 62
End: 2022-08-26

## 2022-08-26 VITALS
DIASTOLIC BLOOD PRESSURE: 76 MMHG | HEIGHT: 64 IN | WEIGHT: 137 LBS | HEART RATE: 72 BPM | SYSTOLIC BLOOD PRESSURE: 129 MMHG | BODY MASS INDEX: 23.39 KG/M2

## 2022-08-26 PROCEDURE — 99215 OFFICE O/P EST HI 40 MIN: CPT

## 2022-08-26 PROCEDURE — 99417 PROLNG OP E/M EACH 15 MIN: CPT

## 2022-08-26 RX ORDER — PROPRANOLOL HYDROCHLORIDE 10 MG/1
10 TABLET ORAL TWICE DAILY
Refills: 0 | Status: ACTIVE | COMMUNITY

## 2022-08-26 NOTE — DATA REVIEWED
[de-identified] : MRI brain 7/2018-"2 mm acute left thalamic stroke" (On my review there is a subtle restricted diffusion on DWI, however no clear ADC correlate). Mod chronic white matter ischemic changes. normal corpus callosum. \par MRI brain w/w/o 2/2020- stable moderate chronic microvascular ischemic changes. No enhancement. No infratentorial lesions.

## 2022-08-26 NOTE — HISTORY OF PRESENT ILLNESS
[FreeTextEntry1] : HPI (initial visit Aug 26, 2022)- Jessica is a 63 yo RH female, previously followed by Dr. King, is now establishing care with me for cognitive decline. \par \par She reports a hx of prior strokes- age age 28 (L arm numbness) and then later in 2018 (L arm weakness- got TPA, dx'd with thalamic stroke, placed o DAPT). Mild residual symptoms. She also reported she was diagnosed with possible MS ~ in 2003, but told it was not a definite diagnosis. Never got LP. She was also diagnosed with DM and has peripheral neuropathy. \par \par She reports cognitive symptoms since spring 2021. Not as sharp as before. She had Covid 2/2020. Never left stove on. 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 NO problems with recalling faces. Some trouble with names. recalls events fine. \par \par She was referred to sleep medicine to rule out HEATHER. \par She was referred to vascular neurology for hx of recurrent strokes and referred to see me for possible hx of MS.\par She was referred to neuropsychologist to further clarify cognitive issues. Reviewed report that was emailed to me prior to this visit - Pt seemed anxious during exam. \par Imp:-  "cognitive performance was intact with subtle frontal-subcortical systems weakness". Etiological considerations- vascular pathology, thyroid disease, mood disorder.\par \par interim Hx- She denies any progression of cognitive issues. She is being worked up for a thyroid disease  (hyperthyroidism)- she does endorse weight loss. She was prescribed propranolol. Endocrinologist believes it is viral and does not need long term treatment. "I still have those brain fog" and has trouble finding words". She brings her brain MRI disc to this visit (2018 and 2020 at [place]).  not concerned about her memory. She believes she is restless since she stopped working and believes that is affecting her. Forgets why she entered rooms/. She has a has a hx of sporadic  migraine headaches with aura. Son was 28 when he had a stroke.

## 2022-08-26 NOTE — ASSESSMENT
[FreeTextEntry1] : Assessment/Plan:\par  62 year old female w/ hx of migraine headaches, Diabetes mellitus, recurrent strokes (age 28 and 2018), has been experiencing cognitive issues since fall 2021- brain fog, word finding difficulty, forgetfulness. She attributes this to loosing her job in 2020 and being more inactive. Her symptoms are stable , no progression. She also recently diagnosed with hyperthyroidism and has been symptomatic- palpitations, weight loss, anxiety, currently being managed by Endo and thought to be viral in etiology. \par \par She was also reportedly diagnosed with possible Multiple sclerosis in 2003- however on reviewing her brain imaging, I do not suspect that is the case, rather her white matter lesions could be explained by chronic microvascular ischemic changes.\par \par In regards to her cognitive symptoms, she was evaluated by neuropsychology and her cognitive performance was intact with subtle frontal-sub cortical weakness- this could likely be explained by vascular changes on her brain MRI +/- new diagnosis of thyroid illness w/ anxiety. \par \par In regards to her recurrent strokes, especially at a young age with moderate microvascular ischemic changes, she had been referred to see vascular neurology (pending). She is on Plavix. Likely etiology is small vessel disease 2/2 DM. However, given hx of migraines headaches, early strokes, white matter disease and an offspring with stroke at early age, cannot completely rule out ? CADASIL. \par \par She had also been referred to sleep medicine to rule out HEATHER- hx of snoring. She has not yet scheduled this appointment. \par \par \par Return to clinic 1 year , sooner if needed\par \par Available imaging studies and/or blood work up were reviewed. A detailed chart review was completed prior to visit.\par \par The above plan was discussed with MEJIA SEGOVIA in great detail.  MEJIA LUCA verbalized understanding and agrees with plan as detailed above. She was advised to call our clinic at [phone] for any new or worsening symptoms, or with any questions or concerns. In case of acute onset of neurological symptoms or worsening presentation, patient was advised to present to nearest emergency room for further evaluation. MEJIA SEGOVIA expressed understanding and all her questions/concerns were addressed.\par \par Valentine Lara M.D\par

## 2022-08-26 NOTE — PHYSICAL EXAM
[FreeTextEntry1] : PHYSICAL EXAM\par Constitutional: Alert, no acute distress \par Psychiatric: appropriate affect and mood\par Pulmonary: No respiratory distress, stable on room air\par \par NEUROLOGICAL EXAM\par Mental status: The patient is alert, attentive, and conversational memory intact\par Speech/language: Clear and fluent with intact comprehension\par Cranial nerves:\par CN II: Visual fields are full to confrontation. Pupil size equal and briskly reactive to light. \par CN III, IV, VI: EOMI, no nystagmus, no ptosis\par CN V: Facial sensation is intact to pinprick in all 3 divisions bilaterally.\par CN VII: Face is symmetric with normal eye closure and smile.\par CN VII: Hearing is normal to rubbing fingers\par CN IX, X: Palate elevates symmetrically. Phonation is normal.\par CN XI: Head turning and shoulder shrug are intact\par CN XII: Tongue is midline with normal movements and no atrophy.\par Motor: Strength is full bilaterally. 5/5 muscle power in bilateral UE and LE.\par Sensory: Intact sensations to light touch in upper and lower extremities. \par Coordination: Rapid alternating movements and fine finger movements are intact. There is no dysmetria on finger-to-nose or heel to shin. There are no abnormal or extraneous movements. \par Gait/Stance: Posture is normal. Gait is steady with normal steps, base, arm swing, and turning. \par \par \par \par

## 2023-05-16 ENCOUNTER — TRANSCRIPTION ENCOUNTER (OUTPATIENT)
Age: 63
End: 2023-05-16

## 2023-05-18 ENCOUNTER — APPOINTMENT (OUTPATIENT)
Dept: OPHTHALMOLOGY | Facility: CLINIC | Age: 63
End: 2023-05-18
Payer: COMMERCIAL

## 2023-05-18 ENCOUNTER — NON-APPOINTMENT (OUTPATIENT)
Age: 63
End: 2023-05-18

## 2023-05-18 PROCEDURE — 92004 COMPRE OPH EXAM NEW PT 1/>: CPT

## 2023-05-18 PROCEDURE — 76514 ECHO EXAM OF EYE THICKNESS: CPT

## 2023-05-18 PROCEDURE — 92133 CPTRZD OPH DX IMG PST SGM ON: CPT

## 2023-06-23 ENCOUNTER — TRANSCRIPTION ENCOUNTER (OUTPATIENT)
Age: 63
End: 2023-06-23

## 2023-06-29 ENCOUNTER — APPOINTMENT (OUTPATIENT)
Dept: OPHTHALMOLOGY | Facility: CLINIC | Age: 63
End: 2023-06-29
Payer: MEDICARE

## 2023-06-29 ENCOUNTER — NON-APPOINTMENT (OUTPATIENT)
Age: 63
End: 2023-06-29

## 2023-06-29 PROCEDURE — 92012 INTRM OPH EXAM EST PATIENT: CPT

## 2023-06-29 PROCEDURE — 92083 EXTENDED VISUAL FIELD XM: CPT

## 2023-08-28 ENCOUNTER — APPOINTMENT (OUTPATIENT)
Dept: NEUROLOGY | Facility: CLINIC | Age: 63
End: 2023-08-28
Payer: MEDICARE

## 2023-08-28 VITALS
HEIGHT: 64 IN | HEART RATE: 93 BPM | SYSTOLIC BLOOD PRESSURE: 120 MMHG | BODY MASS INDEX: 24.24 KG/M2 | DIASTOLIC BLOOD PRESSURE: 71 MMHG | WEIGHT: 142 LBS

## 2023-08-28 DIAGNOSIS — I67.9 CEREBROVASCULAR DISEASE, UNSPECIFIED: ICD-10-CM

## 2023-08-28 DIAGNOSIS — R41.89 OTHER SYMPTOMS AND SIGNS INVOLVING COGNITIVE FUNCTIONS AND AWARENESS: ICD-10-CM

## 2023-08-28 DIAGNOSIS — I63.9 CEREBRAL INFARCTION, UNSPECIFIED: ICD-10-CM

## 2023-08-28 DIAGNOSIS — R06.83 SNORING: ICD-10-CM

## 2023-08-28 PROCEDURE — 99214 OFFICE O/P EST MOD 30 MIN: CPT

## 2023-08-28 RX ORDER — IBUPROFEN 600 MG/1
600 TABLET, FILM COATED ORAL
Qty: 30 | Refills: 0 | Status: ACTIVE | COMMUNITY
Start: 2023-04-19

## 2023-08-28 RX ORDER — AMOXICILLIN 500 MG/1
500 CAPSULE ORAL
Qty: 28 | Refills: 0 | Status: COMPLETED | COMMUNITY
Start: 2023-04-19

## 2023-08-28 NOTE — HISTORY OF PRESENT ILLNESS
[FreeTextEntry1] : INTERIM HX 08/28/2023: Doing well. Cognitive symptoms better, though still has to look at her calendar often. Sleep is not great, sleeping less, trouble falling back to sleep, daytime drowsiness, still snoring. Mood is okay. No recent hospitalizations or stroke like symptoms, when stressed, L arm feels a little numb.   HPI (initial visit Aug 26, 2022)- Jessica is a 61 yo RH female, previously followed by Dr. King, is now establishing care with me for cognitive decline.   She reports a hx of prior strokes- age age 28 (L arm numbness) and then later in 2018 (L arm weakness- got TPA, dx'd with thalamic stroke, placed o DAPT). Mild residual symptoms. She also reported she was diagnosed with possible MS ~ in 2003, but told it was not a definite diagnosis. Never got LP. She was also diagnosed with DM and has peripheral neuropathy.   She reports cognitive symptoms since spring 2021. Not as sharp as before. She had Covid 2/2020. Never left stove on. 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 NO problems with recalling faces. Some trouble with names. recalls events fine.   She was referred to sleep medicine to rule out HEATHER.  She was referred to vascular neurology for hx of recurrent strokes and referred to see me for possible hx of MS. She was referred to neuropsychologist to further clarify cognitive issues. Reviewed report that was emailed to me prior to this visit - Pt seemed anxious during exam.  Imp:-  "cognitive performance was intact with subtle frontal-subcortical systems weakness". Etiological considerations- vascular pathology, thyroid disease, mood disorder.  interim Hx- She denies any progression of cognitive issues. She is being worked up for a thyroid disease  (hyperthyroidism)- she does endorse weight loss. She was prescribed propranolol. Endocrinologist believes it is viral and does not need long term treatment. "I still have those brain fog" and has trouble finding words". She brings her brain MRI disc to this visit (2018 and 2020 at [place]).  not concerned about her memory. She believes she is restless since she stopped working and believes that is affecting her. Forgets why she entered rooms/. She has a has a hx of sporadic  migraine headaches with aura. Son was 28 when he had a stroke.

## 2023-08-28 NOTE — DATA REVIEWED
[de-identified] : MRI brain 7/2018-"2 mm acute left thalamic stroke" (On my review there is a subtle restricted diffusion on DWI, however no clear ADC correlate). Mod chronic white matter ischemic changes. normal corpus callosum. \par  MRI brain w/w/o 2/2020- stable moderate chronic microvascular ischemic changes. No enhancement. No infratentorial lesions.

## 2023-08-28 NOTE — ASSESSMENT
[FreeTextEntry1] : A/P: 63 year old female w/ hx of migraine headaches, Diabetes mellitus, recurrent strokes (age 28 and 2018), has been experiencing cognitive issues since fall 2021( brain fog, word finding difficulty, forgetfulness).   # Cognitive issues:- Since 2020 (after being laid off from her job in [employer]). [] Likely multifactorial, 2/2 sleep disorder +/- mood changes +/- moderate vascular changes on brain MRI. Low suspicion for neurodegenerative disorder.  []   Neuropsychology testing 7/2022 w/ intact cognitive performance, subtle frontal-sub cortical weakness- this could likely be explained by vascular changes on her brain MRI. [] Her cognitive symptoms have now improved. ADL/iADLS remain intact.  [] Will continue to monitor  [] Referred to sleep medicine to rule out HEATHER [] Continue plavix and statin for secondary stroke prevention.  She was also reportedly diagnosed with possible Multiple sclerosis in 2003- however on reviewing her brain imaging, I do not suspect that is the case, rather her white matter lesions could be explained by chronic microvascular ischemic changes.   Return to clinic 1 year , sooner if needed  Valentine Lara M.D

## 2024-06-27 NOTE — PHYSICAL THERAPY INITIAL EVALUATION ADULT - ACTIVE RANGE OF MOTION EXAMINATION, REHAB EVAL
06/27  Patient presented to Tx plan on time, it was discussed that for SS patient is interested in clothing for child and if she thinks of anything else that patient will inform GIULLERMINA GUTIERREZ and or Therapist Yonatan.           
Purpose: Pt met virtually with [facility] Psychology Fellow Yonatan Gilmore, under the supervision of Lore Curiel, PhD. Licensed Clinical Psychologist. Pt was located at [address] [facility]  while Author was located at his home office in [facility].  Pt consented to telehealth appointment. Goal of session was to complete joint treatment plan.     Intervention:  Pt met with Author and pt's TOW.  Pt and TOW discussed social service needs. Author and pt discussed therapy process and how Author typically conducts sessions. Pt requested Author resend link for progressive muscle relaxation, Author included a link for that exercise as well as a link for leaves on a Stream. Pt stated she had reached out to [facility] for therapy for her child who is under age 10.     Mental status:    Pt's attitude was open and engaged, euthymic mood, w/ congruent affect.  Pt was oriented x4.  Concentration appeared focused.  Immediate, recent, and remote memory was intact.  Speech was appropriate rate and volume. Thoughts appeared organized and logical.  Pt denied SI, HI, and psychosis.  Behavior was observed to be appropriate to setting. Judgment intact w/ fair insight.     Plan: Session planned for next week Author to send leaves on stream and PMR email.  Discuss therapy goals and modalities next appointment, scheduled two weeks out    
no Active ROM deficits were identified

## 2024-08-29 ENCOUNTER — APPOINTMENT (OUTPATIENT)
Dept: NEUROLOGY | Facility: CLINIC | Age: 64
End: 2024-08-29
